# Patient Record
Sex: MALE | Race: WHITE | Employment: FULL TIME | URBAN - METROPOLITAN AREA
[De-identification: names, ages, dates, MRNs, and addresses within clinical notes are randomized per-mention and may not be internally consistent; named-entity substitution may affect disease eponyms.]

---

## 2022-09-07 ENCOUNTER — APPOINTMENT (OUTPATIENT)
Dept: GENERAL RADIOLOGY | Age: 22
End: 2022-09-07
Attending: EMERGENCY MEDICINE
Payer: COMMERCIAL

## 2022-09-07 ENCOUNTER — HOSPITAL ENCOUNTER (EMERGENCY)
Age: 22
Discharge: HOME | End: 2022-09-08
Attending: STUDENT IN AN ORGANIZED HEALTH CARE EDUCATION/TRAINING PROGRAM
Payer: COMMERCIAL

## 2022-09-07 DIAGNOSIS — S82.831A CLOSED FRACTURE OF DISTAL END OF RIGHT FIBULA, UNSPECIFIED FRACTURE MORPHOLOGY, INITIAL ENCOUNTER: Primary | ICD-10-CM

## 2022-09-07 PROCEDURE — 73620 X-RAY EXAM OF FOOT: CPT

## 2022-09-07 PROCEDURE — 73600 X-RAY EXAM OF ANKLE: CPT

## 2022-09-07 PROCEDURE — 99284 EMERGENCY DEPT VISIT MOD MDM: CPT

## 2022-09-07 PROCEDURE — 96372 THER/PROPH/DIAG INJ SC/IM: CPT

## 2022-09-07 PROCEDURE — 74011250636 HC RX REV CODE- 250/636: Performed by: PHYSICIAN ASSISTANT

## 2022-09-07 PROCEDURE — 75810000053 HC SPLINT APPLICATION

## 2022-09-07 PROCEDURE — 73590 X-RAY EXAM OF LOWER LEG: CPT

## 2022-09-07 RX ORDER — KETOROLAC TROMETHAMINE 30 MG/ML
30 INJECTION, SOLUTION INTRAMUSCULAR; INTRAVENOUS
Status: COMPLETED | OUTPATIENT
Start: 2022-09-07 | End: 2022-09-07

## 2022-09-07 RX ORDER — HYDROMORPHONE HYDROCHLORIDE 1 MG/ML
1 INJECTION, SOLUTION INTRAMUSCULAR; INTRAVENOUS; SUBCUTANEOUS
Status: COMPLETED | OUTPATIENT
Start: 2022-09-07 | End: 2022-09-07

## 2022-09-07 RX ADMIN — HYDROMORPHONE HYDROCHLORIDE 1 MG: 1 INJECTION, SOLUTION INTRAMUSCULAR; INTRAVENOUS; SUBCUTANEOUS at 23:35

## 2022-09-07 RX ADMIN — KETOROLAC TROMETHAMINE 30 MG: 30 INJECTION, SOLUTION INTRAMUSCULAR; INTRAVENOUS at 23:35

## 2022-09-07 NOTE — LETTER
[address]  [address] [facility] [id number] [phone]    Work/School Note    Date: 9/7/2022    To Whom It May concern:    Warden Moore was seen and treated today in the emergency room by the following provider(s):  Attending Provider: Brittany Meza MD  Physician Assistant: NAYELI Tracy. Warden Moore is excused from work/school on 9/8/2022 through 9/10/2022. He is medically clear to return to work/school on 9/11/2022.          Sincerely,          NAYELI Key

## 2022-09-08 VITALS
OXYGEN SATURATION: 98 % | DIASTOLIC BLOOD PRESSURE: 72 MMHG | HEART RATE: 68 BPM | TEMPERATURE: 97.8 F | SYSTOLIC BLOOD PRESSURE: 137 MMHG

## 2022-09-08 PROCEDURE — 74011250637 HC RX REV CODE- 250/637: Performed by: PHYSICIAN ASSISTANT

## 2022-09-08 RX ORDER — HYDROCODONE BITARTRATE AND ACETAMINOPHEN 5; 325 MG/1; MG/1
1 TABLET ORAL
Qty: 12 TABLET | Refills: 0 | Status: SHIPPED | OUTPATIENT
Start: 2022-09-08 | End: 2022-09-11

## 2022-09-08 RX ORDER — OXYCODONE HYDROCHLORIDE 5 MG/1
5 TABLET ORAL
Status: COMPLETED | OUTPATIENT
Start: 2022-09-08 | End: 2022-09-08

## 2022-09-08 RX ADMIN — OXYCODONE 5 MG: 5 TABLET ORAL at 01:20

## 2022-09-08 NOTE — ED TRIAGE NOTES
Triage: Pt arrives from a soccer game where he fell and heard a crack in his right ankle. Some deformity noted. Denies hitting his head.  Pain 7/10

## 2022-09-08 NOTE — DISCHARGE INSTRUCTIONS
Return for new or worsening symptoms. Follow RICE and splint care instructions. Call the orthopedic office in the morning, tell them that you were seen in the emergency department and you have a fracture of your distal fibula and need to be seen for follow-up. You may take ibuprofen or Tylenol for mild to moderate pain and prescribed medication for severe pain, do not combine with alcohol or take if you are going to be driving. Do not exceed 3g of Tylenol in a day.

## 2022-09-08 NOTE — ED NOTES
Patient given crutches and education. Patient was able to demonstrate use. Ambulated with crutches with a steady gait independently. Raj Reynolds discharged patient and gave paperwork.

## 2022-09-08 NOTE — ED PROVIDER NOTES
23-year-old male no significant medical history presenting to the ED for right ankle pain. Patient notes that just prior to arrival he was playing in a game of soccer when he collided with another player, fell down, heard a crack and had acute onset of pain in the ankle. Patient notes the pain is moderately severe, sharp, is unable to bear weight. No head trauma or other injuries. No treatment prior to arrival.  No other concerns. Past medical history: Denies  Past surgical history: Denies  Social history: Non-smoker    The history is provided by the patient and a friend. Ankle Injury        No past medical history on file. No past surgical history on file. Family History:   Problem Relation Age of Onset    Coronary Art Dis Paternal Grandmother        Social History     Socioeconomic History    Marital status: SINGLE     Spouse name: Not on file    Number of children: Not on file    Years of education: Not on file    Highest education level: Not on file   Occupational History    Not on file   Tobacco Use    Smoking status: Never    Smokeless tobacco: Never   Substance and Sexual Activity    Alcohol use: Yes     Comment: social    Drug use: Not on file    Sexual activity: Not on file   Other Topics Concern    Not on file   Social History Narrative    Not on file     Social Determinants of Health     Financial Resource Strain: Not on file   Food Insecurity: Not on file   Transportation Needs: Not on file   Physical Activity: Not on file   Stress: Not on file   Social Connections: Not on file   Intimate Partner Violence: Not on file   Housing Stability: Not on file         ALLERGIES: Patient has no known allergies. Review of Systems   Constitutional:  Negative for fever. HENT:  Negative for facial swelling. Respiratory:  Negative for shortness of breath. Cardiovascular:  Negative for chest pain. Gastrointestinal:  Negative for vomiting.    Musculoskeletal:  Positive for arthralgias and joint swelling. Skin:  Negative for wound. Neurological:  Negative for syncope. All other systems reviewed and are negative. Vitals:    09/08/22 0054   BP: 137/72   Pulse: 68   Temp: 97.8 °F (36.6 °C)   SpO2: 98%            Physical Exam  Vitals and nursing note reviewed. Constitutional:       Appearance: He is well-developed. Comments: Pleasant, well-appearing, no distress   HENT:      Head: Normocephalic. Eyes:      Conjunctiva/sclera: Conjunctivae normal.   Cardiovascular:      Rate and Rhythm: Normal rate. Pulmonary:      Effort: Pulmonary effort is normal. No respiratory distress. Musculoskeletal:      Cervical back: Neck supple. Comments: Right lower leg: Exposed for exam.  Mild tenderness over the proximal medial lower leg, unremarkable knee. No posterior calf tenderness. + Diffuse swelling of the ankle with tenderness over the lateral and medial malleoli. No tenderness in the midfoot. Strong pedal pulses, normal distal cap refill, sensation, patient able to wiggle his toes. Skin intact. Skin:     General: Skin is warm and dry. Neurological:      Mental Status: He is alert and oriented to person, place, and time. MDM  Number of Diagnoses or Management Options  Closed fracture of distal end of right fibula, unspecified fracture morphology, initial encounter  Diagnosis management comments: 76-year-old male presenting to the ED for right ankle pain after an injury sustained in a soccer accident. X-ray showing a distal fibular fracture. Borderline mortise, discussed possibility and likelihood of concurrent soft tissue injury. Patient splinted, given crutches, counseled on supportive care, Ortho follow-up.        Amount and/or Complexity of Data Reviewed  Tests in the radiology section of CPT®: ordered and reviewed  Discuss the patient with other providers: yes (Dr. Rocio Vega ED attending)  Independent visualization of images, tracings, or specimens: yes (XR) Procedures                           Splint applied by SOTO Clifford, and evaluated by NAYELI Summers. Neurovascular status intact post splint application. Desired position maintained.

## 2023-05-16 RX ORDER — FINASTERIDE 1 MG/1
1 TABLET, FILM COATED ORAL DAILY
COMMUNITY
Start: 2020-08-24

## 2023-09-21 ENCOUNTER — HOSPITAL ENCOUNTER (OUTPATIENT)
Facility: HOSPITAL | Age: 23
Discharge: HOME | End: 2023-09-21
Attending: ORTHOPAEDIC SURGERY
Payer: COMMERCIAL

## 2023-09-21 DIAGNOSIS — M84.361A STRESS FRACTURE OF RIGHT TIBIA, INITIAL ENCOUNTER: ICD-10-CM

## 2023-09-21 DIAGNOSIS — S82.431A CLOSED DISPLACED OBLIQUE FRACTURE OF SHAFT OF RIGHT FIBULA, INITIAL ENCOUNTER: ICD-10-CM

## 2023-09-21 PROCEDURE — 73718 MRI LOWER EXTREMITY W/O DYE: CPT
